# Patient Record
Sex: MALE | Race: WHITE | NOT HISPANIC OR LATINO
[De-identification: names, ages, dates, MRNs, and addresses within clinical notes are randomized per-mention and may not be internally consistent; named-entity substitution may affect disease eponyms.]

---

## 2020-12-22 PROBLEM — Z00.00 ENCOUNTER FOR PREVENTIVE HEALTH EXAMINATION: Status: ACTIVE | Noted: 2020-12-22

## 2021-01-06 ENCOUNTER — APPOINTMENT (OUTPATIENT)
Dept: PHYSICAL MEDICINE AND REHAB | Facility: CLINIC | Age: 71
End: 2021-01-06
Payer: COMMERCIAL

## 2021-01-06 VITALS — TEMPERATURE: 97.1 F | HEIGHT: 70 IN | BODY MASS INDEX: 43.67 KG/M2 | WEIGHT: 305 LBS

## 2021-01-06 VITALS
HEART RATE: 65 BPM | OXYGEN SATURATION: 98 % | TEMPERATURE: 97.3 F | DIASTOLIC BLOOD PRESSURE: 85 MMHG | SYSTOLIC BLOOD PRESSURE: 143 MMHG

## 2021-01-06 DIAGNOSIS — E66.9 OBESITY, UNSPECIFIED: ICD-10-CM

## 2021-01-06 DIAGNOSIS — G47.33 OBSTRUCTIVE SLEEP APNEA (ADULT) (PEDIATRIC): ICD-10-CM

## 2021-01-06 DIAGNOSIS — Z99.89 OBSTRUCTIVE SLEEP APNEA (ADULT) (PEDIATRIC): ICD-10-CM

## 2021-01-06 DIAGNOSIS — G62.9 POLYNEUROPATHY, UNSPECIFIED: ICD-10-CM

## 2021-01-06 DIAGNOSIS — I10 ESSENTIAL (PRIMARY) HYPERTENSION: ICD-10-CM

## 2021-01-06 DIAGNOSIS — E78.5 HYPERLIPIDEMIA, UNSPECIFIED: ICD-10-CM

## 2021-01-06 PROCEDURE — 99203 OFFICE O/P NEW LOW 30 MIN: CPT

## 2021-01-06 PROCEDURE — 99072 ADDL SUPL MATRL&STAF TM PHE: CPT

## 2021-01-06 NOTE — ASSESSMENT
[FreeTextEntry1] : 70 year old man with h/o HTN, HLD, FROILAN with osteoarthritis of b/l knees, lumbar spondylosis \par xrays of knees 1/30/20 show severe tricompartmental osteoarthritic changes in both knees \par xray of lumbar spine 1/30/20 shows multilevel degenerative disc disease, most prominent at L2-L3, grade one retrolisthesis\par patient is using two canes to ambulate \par patient requires renewal of disability parking permit

## 2021-01-06 NOTE — PHYSICAL EXAM
[de-identified] : Alert, NAD, able to stand from seated position, slow transfers, uses two canes to ambulate [de-identified] : normal neck ROM [de-identified] : breathing comfortably  [de-identified] : warm, well perfused  [de-identified] : obese [de-identified] : no spinal tenderness, normal ROM [de-identified] : right knee, healed scar medial knee, decreased ROM, limited flexion, +effusion, medial joint line tenderness, +crepitus  [de-identified] : diminished reflexes, sensation intact  [de-identified] : normal affect

## 2021-01-06 NOTE — HISTORY OF PRESENT ILLNESS
[FreeTextEntry1] : Patient is a 70 year old man h/o HTN, HLD, FROILAN with chronic knee and lower back pain who presents for evaluation, needs renewal of disabiliy parking permit. Patient reports he has pain daily, when he wakes up in morning, stiffness in multiple joints, decreased endurance and mobility. He uses two canes when out of the house, uses his car for shopping and doctor's visits. He is unable to rely public transportation due to inability to tolerate standing. He has tried medications, joint injections, physical therapy, was a recommended for knee replacement surgery, but not a candidate due to FROILAN.

## 2021-01-06 NOTE — REVIEW OF SYSTEMS
[Fever] : no fever [Chills] : no chills [Earache] : no earache [Hearing Loss] : no loss of hearing [Chest Pain] : no chest pain [Palpitations] : no palpitations [Shortness Of Breath] : no shortness of breath [Cough] : no cough [Nausea] : no nausea [Incontinence] : no incontinence [Joint Pain] : joint pain [Joint Stiffness] : joint stiffness [Muscle Pain] : muscle pain [Insomnia] : no insomnia [Anxiety] : no anxiety [Easy Bruising] : no tendency for easy bruising

## 2022-07-20 ENCOUNTER — APPOINTMENT (OUTPATIENT)
Dept: PHYSICAL MEDICINE AND REHAB | Facility: CLINIC | Age: 72
End: 2022-07-20

## 2022-07-20 VITALS — OXYGEN SATURATION: 96 % | HEART RATE: 58 BPM | DIASTOLIC BLOOD PRESSURE: 71 MMHG | SYSTOLIC BLOOD PRESSURE: 133 MMHG

## 2022-07-20 DIAGNOSIS — M47.816 SPONDYLOSIS W/OUT MYELOPATHY OR RADICULOPATHY, LUMBAR REGION: ICD-10-CM

## 2022-07-20 DIAGNOSIS — M17.0 BILATERAL PRIMARY OSTEOARTHRITIS OF KNEE: ICD-10-CM

## 2022-07-20 PROCEDURE — 99213 OFFICE O/P EST LOW 20 MIN: CPT

## 2022-07-20 NOTE — REVIEW OF SYSTEMS
[Fever] : no fever [Chills] : no chills [Chest Pain] : no chest pain [Palpitations] : no palpitations [Shortness Of Breath] : no shortness of breath [Wheezing] : no wheezing [Joint Pain] : joint pain [Joint Stiffness] : joint stiffness [Headache] : no headaches [Dizziness] : no dizziness

## 2022-07-20 NOTE — HISTORY OF PRESENT ILLNESS
[FreeTextEntry1] : Patient is a 71 year old man who presents for follow up. He was last seen in January 2021 with knee and lower back pain. Patient reports he has lost 60 lbs, now using one cane, continues to have knee and lower back pain, worse with changes in weather, rain, takes tylenol as needed. He is stretching, doing strengthening exercises on his own. Difficulty sleeping due to FROILAN.

## 2022-07-20 NOTE — PHYSICAL EXAM
[de-identified] : Alert, NAD, able to stand from seated position, slow transfers, uses cane to ambulate [de-identified] : normal neck ROM [de-identified] : breathing comfortably  [de-identified] : warm, well perfused  [de-identified] : obese [de-identified] : no spinal tenderness, normal ROM [de-identified] : right knee, healed scar medial knee, decreased ROM, limited flexion [de-identified] : sensation intact  [de-identified] : normal affect

## 2022-07-20 NOTE — ASSESSMENT
[FreeTextEntry1] : 71year old man with h/o HTN, HLD, FROILAN, severe osteoarthritis b/l knees, lumbar spondylosis\par continue monitoring diet/exercise, daily stretching, strengthening\par requires disability plates, forms filled out

## 2023-05-03 ENCOUNTER — APPOINTMENT (OUTPATIENT)
Dept: ORTHOPEDIC SURGERY | Facility: CLINIC | Age: 73
End: 2023-05-03
Payer: COMMERCIAL

## 2023-05-03 DIAGNOSIS — Z98.890 OTHER SPECIFIED POSTPROCEDURAL STATES: ICD-10-CM

## 2023-05-03 DIAGNOSIS — M75.102 UNSPECIFIED ROTATOR CUFF TEAR OR RUPTURE OF LEFT SHOULDER, NOT SPECIFIED AS TRAUMATIC: ICD-10-CM

## 2023-05-03 DIAGNOSIS — M25.512 PAIN IN LEFT SHOULDER: ICD-10-CM

## 2023-05-03 PROCEDURE — 73030 X-RAY EXAM OF SHOULDER: CPT | Mod: LT

## 2023-05-03 PROCEDURE — 99204 OFFICE O/P NEW MOD 45 MIN: CPT | Mod: 25

## 2023-05-03 PROCEDURE — 20611 DRAIN/INJ JOINT/BURSA W/US: CPT | Mod: LT

## 2023-05-03 PROCEDURE — S0020: CPT | Mod: 59

## 2023-05-11 PROBLEM — Z98.890 H/O ARTHROSCOPY OF SHOULDER: Status: ACTIVE | Noted: 2023-05-11

## 2023-05-11 NOTE — DISCUSSION/SUMMARY
[de-identified] : We discussed their diagnosis and treatment options at length. We will first attempt conservative treatment with a course of PT and anti-inflammatory medication. The patient was provided with a prescription to work on scapular strengthening and rotator cuff strengthening on the impingement syndrome protocol. We also discussed the possible of a corticosteroid injection in the future in order to help decrease inflammation and pain so that they can perform better therapy.\par \par Follow up in 6 weeks to re-evaluate progress with therapy \par \par Instructions: Dx / Natural History\par The patient was advised of the diagnosis.  The natural history of the pathology was explained in full to the patient in layman's terms.  Several different treatment options were discussed and explained in full to the patient including the risks and benefits of both surgical and non-surgical treatments.  All questions and concerns were answered. \par \par RICE\par I explained to the patient that rest, ice, compression, and elevation would benefit them.  They may return to activity after follow-up or when they no longer have any pain.\par \par NSAIDs - OTC\par Patient is to begin over the counter oral anti-inflammatory medications on an as needed basis, as long as there are no medical contraindications.  Patient is counseled on possible GI and blood pressure side effects.\par \par Pain Guide Activities\par The patient was advised to let pain guide the gradual advancement of activities.\par \par Icing\par The patient was advised to apply ice (wrapped in a towel or protective covering) to the area daily (20 minutes at a time, 2-4X/day).\par \par All of the patient's questions were answered to His satisfaction. Diagnoses and potential treatments were reviewed. He agreed with the plan and would like to move forward with it.

## 2023-05-11 NOTE — HISTORY OF PRESENT ILLNESS
[6] : 6 [Dull/Aching] : dull/aching [Intermittent] : intermittent [Meds] : meds [] : no [FreeTextEntry5] : LANE 72 year old M here for LT shoulder, onset pain for over 2 months ,\par pt takes Tylenol for the pain \par hx of a rotator cuff tear a few years ago \par \par pt reports he has a mass on his LT kidney , awaiting a biopsy  [de-identified] : utilizing arm  [de-identified] : outside provider  [de-identified] : a few years ago

## 2023-05-11 NOTE — PHYSICAL EXAM
[de-identified] : The patient is a well appearing 72 year year old male of their stated age.\par Neck is supple & nontender to palpation. Negative Spurling's test.\par \par General: in no acute distress, seated comfortably, moving easily\par Skin: No discoloration, rashes; on palpation skin is dry, \par Neuro: Normal sensation all dermatomes, motor all myotomes\par Vascular: Normal pulses, no edema, normal temperature\par Coordination and balance: Normal\par Psych: normal mood and affect, non pressured speech, alert and oriented x3\par \par Effected Shoulder \par Inspection:\par Incisions are well healed\par Scapula Winging: Negative\par Deformity: None\par Erythema: None\par Ecchymosis: None\par Abrasions: None\par Effusion: None\par \par Range of Motion:\par Active Forward Flexion: 160 degrees \par Passive Forward Flexion: 170 degrees \par Active IR : L4\par Passive ER : 30 degrees\par \par Motor Exam:\par Forward Flexion: 4+ out of 5\par Flexion Plane of Scapula: 5 out of 5\par Abduction: 4+ out of 5\par Internal Rotation: 5 out of 5\par External Rotation: 4+ out of 5\par Distal Motor Strength: 5 out of 5\par \par Stability Testing:\par Anterior: 1+\par Posterior: 1+\par Sulcus N: 1+\par Sulcus ER: 1+\par \par Provocative Tests:\par Drop Arm: Negative\par Hartmann/Impingement: Positive\par Gove: Positive\par X-Arm Adduction: Negative\par Belly Press: Negative\par Bear Hug: Negative\par Lift Off: Negative\par Apprehension: Negative\par Relocation: Negative\par Posterior Load & Shift: Negative\par \par Palpation:\par AC Joint: Nontender\par Clavicle: Nontender\par SC Joint: Nontender\par Bicepital Groove: Positive\par Coracoid Process: Nontender\par Pectoralis Minor Tendon: Nontender\par Pectoralis Major Tendon: Nontender & palpably intact\par Latissimus Dorsi: Nontender \par Proximal Humerus: Positive\par Scapula Body: Nontender\par Medial Scapula Boarder: Nontender\par Scapula Spine: Nontender\par \par Neurologic Exam: Sensation to Light Touch:\par Axillary: Grossly intact\par Ulnar: Grossly intact\par Radial: Grossly intact\par Median: Grossly intact\par Other:  N/A\par \par Circulatory/Pulses:\par Ulnar: 2+\par Radial: 2+\par Other Pertinent Findings: None\par \par Contralateral Shoulder\par Range of Motion:\par Active Forward Flexion: 180 degrees \par Active Abduction: 180 degrees \par Passive Forward Flexion: 180 degrees \par Passive Abduction: 180 degrees \par ER @ 90 degrees: 90 degrees\par IR @ 90 degrees: 45 degrees\par ER @ 0 degrees: 50 degrees\par \par Motor Exam:\par Forward Flexion: 5 out of 5\par Flexion Plane of Scapula: 5 out of 5\par Abduction: 5 out of 5\par Internal Rotation: 5 out of 5\par External Rotation: 5 out of 5\par Distal Motor Strength: 5 out of 5\par \par Stability Testing:\par Anterior: 1+\par Posterior: 1+\par Sulcus N: 1+\par Sulcus ER: 1+\par \par Other Pertinent Findings: None\par   [Left] : left shoulder [There are no fractures, subluxations or dislocations. No significant abnormalities are seen] : There are no fractures, subluxations or dislocations. No significant abnormalities are seen [Degenerative change] : Degenerative change [Type 2 acromion] : Type 2 acromion [FreeTextEntry1] : radio-opaque object in the anterior glenoid representing likely previous anchor from surgery

## 2023-07-26 ENCOUNTER — APPOINTMENT (OUTPATIENT)
Dept: ORTHOPEDIC SURGERY | Facility: CLINIC | Age: 73
End: 2023-07-26